# Patient Record
Sex: FEMALE | Race: ASIAN | NOT HISPANIC OR LATINO | Employment: FULL TIME | URBAN - METROPOLITAN AREA
[De-identification: names, ages, dates, MRNs, and addresses within clinical notes are randomized per-mention and may not be internally consistent; named-entity substitution may affect disease eponyms.]

---

## 2019-02-05 ENCOUNTER — OFFICE VISIT (OUTPATIENT)
Dept: GASTROENTEROLOGY | Facility: MEDICAL CENTER | Age: 29
End: 2019-02-05
Payer: COMMERCIAL

## 2019-02-05 ENCOUNTER — LAB (OUTPATIENT)
Dept: LAB | Facility: MEDICAL CENTER | Age: 29
End: 2019-02-05
Attending: INTERNAL MEDICINE
Payer: COMMERCIAL

## 2019-02-05 VITALS
BODY MASS INDEX: 21.6 KG/M2 | TEMPERATURE: 99.1 F | HEART RATE: 64 BPM | WEIGHT: 110 LBS | DIASTOLIC BLOOD PRESSURE: 40 MMHG | SYSTOLIC BLOOD PRESSURE: 84 MMHG | HEIGHT: 60 IN

## 2019-02-05 DIAGNOSIS — K62.5 RECTAL BLEEDING: ICD-10-CM

## 2019-02-05 DIAGNOSIS — R10.13 EPIGASTRIC ABDOMINAL PAIN: ICD-10-CM

## 2019-02-05 DIAGNOSIS — R10.13 EPIGASTRIC ABDOMINAL PAIN: Primary | ICD-10-CM

## 2019-02-05 LAB
CRP SERPL QL: <3 MG/L
ERYTHROCYTE [SEDIMENTATION RATE] IN BLOOD: 8 MM/HOUR (ref 0–20)

## 2019-02-05 PROCEDURE — 36415 COLL VENOUS BLD VENIPUNCTURE: CPT

## 2019-02-05 PROCEDURE — 83516 IMMUNOASSAY NONANTIBODY: CPT

## 2019-02-05 PROCEDURE — 99244 OFF/OP CNSLTJ NEW/EST MOD 40: CPT | Performed by: INTERNAL MEDICINE

## 2019-02-05 PROCEDURE — 86255 FLUORESCENT ANTIBODY SCREEN: CPT

## 2019-02-05 PROCEDURE — 85652 RBC SED RATE AUTOMATED: CPT

## 2019-02-05 PROCEDURE — 86140 C-REACTIVE PROTEIN: CPT

## 2019-02-05 PROCEDURE — 82784 ASSAY IGA/IGD/IGG/IGM EACH: CPT

## 2019-02-05 RX ORDER — PANTOPRAZOLE SODIUM 40 MG/1
40 TABLET, DELAYED RELEASE ORAL DAILY
COMMUNITY

## 2019-02-05 NOTE — LETTER
February 6, 2019     [facility]  [address]    Patient: J Luis Hannon   YOB: 1990   Date of Visit: 2/5/2019       Dear Dr Taurus Machuca: Thank you for referring J Luis Hannon to me for evaluation  Below are my notes for this consultation  If you have questions, please do not hesitate to call me  I look forward to following your patient along with you  Sincerely,    LAURIE Monroe  [facility]  Mobile: [phone]  Available on Skuid  [email or web address]           CC: No Recipients  Tawana Tsang MD  2/6/2019  1:30 PM  Sign at close encounter  [facility] - Outpatient Consultation  J Luis Hannon 34 y o  female MRN: [id number]  Encounter: [id number]      PCP: Henry Roque  Referring: Henry Roque  [address]      ASSESSMENT AND PLAN:      1  Epigastric abdominal pain  Epigastric abdominal pain, associated with bloating, most consistent with dyspepsia versus symptomatic GERD, especially given her improvement, response to PPI therapy this is most consistent with these diagnoses  I would recommend an EGD, to evaluate for erosive GERD, rule out H pylori  R/o celiac with labs as below  - C-reactive protein; Future  - Sedimentation rate, automated; Future  - Celiac Disease Antibody Profile; Future  - Case request operating room: ESOPHAGOGASTRODUODENOSCOPY (EGD); Standing  - Case request operating room: ESOPHAGOGASTRODUODENOSCOPY (EGD)    2  Rectal bleeding  Per history, presentation to PatientCare, hemoglobin has remained stable despite rectal bleeding  Intermittent symptoms suggest anorectal disease  I will obtain a C-reactive protein, sed rate to evaluate, rule out inflammatory bowel disease  If either of these are elevated I would recommend she also undergo colonoscopy    At this time she is hesitant to undergo colonoscopy, and agrees with treatment of localize anorectal disease  I would encourage use of a fiber supplementation with Metamucil, to bulk stools and prevent anorectal irritation   - C-reactive protein; Future  - Sedimentation rate, automated; Future  - Celiac Disease Antibody Profile; Future      ______________________________________________________________________    HPI:      Patient is a 68-year-old female referred for epigastric pain, rectal bleeding  She has no significant past medical history  She reports a longstanding history of abdominal symptoms including postprandial bloating associated with nausea and fatigue  In the past she has been prescribed PPI therapy with good relief of her symptoms, but has not been taking this medication consistently  Two days ago, shortly after dinner time, she complains of an acute onset of epigastric pain and lower pelvic abdominal pain  Symptoms were severe, debilitating in nature, and decreased her ability to sleep at night  She does note associated bloating, burping with the symptoms  She states that she drank bubble tea, a CABG, chicken wings and rice over the weekend  She attempted intake given antacid in marie tea without symptom relief  She then tried her Protonix, 40 mg at home with significant improvement in her symptoms  She was seen at outpatient Patient Care and treated symptomatically  Her symptoms have resolved at time of presentation in my office today  Over the same time  As onset of epigastric pain, she has noted rectal bleeding  First episode occurred once two weeks ago, she was asymptomatic in the interim, and she then had a 2nd episode of rectal bleeding over this past weekend  She describes bright red blood per rectum, that makes the toilet bowl red  She states that her labs, including hemoglobin, were normal at patient care  She has never had an endoscopy or colonoscopy previously    She does admit that her parents, her father who was an internal medicine physician help her out with her symptoms, and a previously encouraged her not to take a PPI medication on a regular basis  REVIEW OF SYSTEMS:    CONSTITUTIONAL: Denies any fever, chills, rigors, and weight loss  HEENT: No earache or tinnitus  Denies hearing loss or visual disturbances  CARDIOVASCULAR: No chest pain or palpitations  RESPIRATORY: Denies any cough, hemoptysis, shortness of breath or dyspnea on exertion  GASTROINTESTINAL: As noted in the History of Present Illness  GENITOURINARY: No problems with urination  Denies any hematuria or dysuria  NEUROLOGIC: No dizziness or vertigo, denies headaches  MUSCULOSKELETAL: Denies any muscle or joint pain  SKIN: Denies skin rashes or itching  ENDOCRINE: Denies excessive thirst  Denies intolerance to heat or cold  PSYCHOSOCIAL: Denies depression or anxiety  Denies any recent memory loss  Historical Information   Past Medical History:   Diagnosis Date    Stomach ulcer      History reviewed  No pertinent surgical history  Social History   History   Alcohol use Not on file     History   Drug use: Unknown     History   Smoking Status    Never Smoker   Smokeless Tobacco    Never Used     Family History   Problem Relation Age of Onset    Colon cancer Paternal Grandmother        Meds/Allergies       Current Outpatient Prescriptions:     pantoprazole (PROTONIX) 40 mg tablet    No Known Allergies        Objective     Blood pressure (!) 84/40, pulse 64, temperature 99 1 °F (37 3 °C), height 5' (1 524 m), weight 49 9 kg (110 lb)  Body mass index is 21 48 kg/m²  PHYSICAL EXAM:      General Appearance:   Alert, cooperative, no distress   HEENT:   Normocephalic, atraumatic, anicteric      Neck:  Supple, symmetrical, trachea midline   Lungs:   Clear to auscultation bilaterally; no rales, rhonchi or wheezing; respirations unlabored    Heart[de-identified]   Regular rate and rhythm; no murmur, rub, or gallop     Abdomen:   Soft, non-tender, non-distended; normal bowel sounds; no masses, no organomegaly    Genitalia:   Deferred    Rectal:   Deferred    Extremities:  No cyanosis, clubbing or edema    Pulses:  2+ and symmetric    Skin:  No jaundice, rashes, or lesions    Lymph nodes:  No palpable cervical lymphadenopathy        Lab Results:       Radiology Results:   No results found

## 2019-02-06 ENCOUNTER — TELEPHONE (OUTPATIENT)
Dept: GASTROENTEROLOGY | Facility: MEDICAL CENTER | Age: 29
End: 2019-02-06

## 2019-02-06 LAB
ENDOMYSIUM IGA SER QL: NEGATIVE
GLIADIN PEPTIDE IGA SER-ACNC: 3 UNITS (ref 0–19)
GLIADIN PEPTIDE IGG SER-ACNC: 2 UNITS (ref 0–19)
IGA SERPL-MCNC: 148 MG/DL (ref 87–352)
TTG IGA SER-ACNC: <2 U/ML (ref 0–3)
TTG IGG SER-ACNC: <2 U/ML (ref 0–5)

## 2019-02-06 NOTE — PATIENT INSTRUCTIONS
The patient is scheduled on 3//7/19 for an egd with Dr Andreea Jimenes at [place] [address]  The Ma went over the prep with the patient  She is aware she will be called the day prior with an arrival time

## 2019-02-06 NOTE — TELEPHONE ENCOUNTER
Called patient, LMOM to call the office    ----- Message from Pearl Duverney, MD sent at 2/6/2019 10:02 AM EST -----  Please call patient with results - inflammatory markers negative

## 2019-02-06 NOTE — PROGRESS NOTES
[facility] - Outpatient Consultation  Sadia Sorensen 34 y o  female MRN: [id number]  Encounter: [id number]      PCP: Casie Hunter  Referring: Casie [place] [address]  [facility], [address] [facility]      ASSESSMENT AND PLAN:      1  Epigastric abdominal pain  Epigastric abdominal pain, associated with bloating, most consistent with dyspepsia versus symptomatic GERD, especially given her improvement, response to PPI therapy this is most consistent with these diagnoses  I would recommend an EGD, to evaluate for erosive GERD, rule out H pylori  R/o celiac with labs as below  I do encourage her to complete a two week course of PPI therapy with protonix 40 mg daily  - C-reactive protein; Future  - Sedimentation rate, automated; Future  - Celiac Disease Antibody Profile; Future  - Case request operating room: ESOPHAGOGASTRODUODENOSCOPY (EGD); Standing  - Case request operating room: ESOPHAGOGASTRODUODENOSCOPY (EGD)    2  Rectal bleeding  [facility] has remained stable despite rectal bleeding  Intermittent symptoms suggest anorectal disease  I will obtain a C-reactive protein, sed rate to evaluate, rule out inflammatory bowel disease  If either of these are elevated I would recommend she also undergo colonoscopy  At this time she is hesitant to undergo colonoscopy, and agrees with treatment of localize anorectal disease  I would encourage use of a fiber supplementation with Metamucil, to bulk stools and prevent anorectal irritation   - C-reactive protein; Future  - Sedimentation rate, automated; Future  - Celiac Disease Antibody Profile; Future      ______________________________________________________________________    HPI:      Patient is a 77-year-old female referred for epigastric pain, rectal bleeding  She has no significant past medical history    She reports a longstanding history of abdominal symptoms including postprandial bloating associated with nausea and fatigue  In the past she has been prescribed PPI therapy with good relief of her symptoms, but has not been taking this medication consistently  Two days ago, shortly after dinner time, she complains of an acute onset of epigastric pain and lower pelvic abdominal pain  Symptoms were severe, debilitating in nature, and decreased her ability to sleep at night  She does note associated bloating, burping with the symptoms  She states that she drank bubble tea, a CABG, chicken wings and rice over the weekend  She attempted intake given antacid in marie tea without symptom relief  She then tried her Protonix, 40 mg at home with significant improvement in her symptoms  She was seen at outpatient Patient Care and treated symptomatically  Her symptoms have resolved at time of presentation in my office today  Over the same time  As onset of epigastric pain, she has noted rectal bleeding  First episode occurred once two weeks ago, she was asymptomatic in the interim, and she then had a 2nd episode of rectal bleeding over this past weekend  She describes bright red blood per rectum, that makes the toilet bowl red  She states that her labs, including hemoglobin, were normal at patient care  She has never had an endoscopy or colonoscopy previously  She does admit that her parents, her father who was an internal medicine physician help her out with her symptoms, and a previously encouraged her not to take a PPI medication on a regular basis  REVIEW OF SYSTEMS:    CONSTITUTIONAL: Denies any fever, chills, rigors, and weight loss  HEENT: No earache or tinnitus  Denies hearing loss or visual disturbances  CARDIOVASCULAR: No chest pain or palpitations  RESPIRATORY: Denies any cough, hemoptysis, shortness of breath or dyspnea on exertion  GASTROINTESTINAL: As noted in the History of Present Illness  GENITOURINARY: No problems with urination   Denies any hematuria or dysuria  NEUROLOGIC: No dizziness or vertigo, denies headaches  MUSCULOSKELETAL: Denies any muscle or joint pain  SKIN: Denies skin rashes or itching  ENDOCRINE: Denies excessive thirst  Denies intolerance to heat or cold  PSYCHOSOCIAL: Denies depression or anxiety  Denies any recent memory loss  Historical Information   Past Medical History:   Diagnosis Date    Stomach ulcer      History reviewed  No pertinent surgical history  Social History   History   Alcohol use Not on file     History   Drug use: Unknown     History   Smoking Status    Never Smoker   Smokeless Tobacco    Never Used     Family History   Problem Relation Age of Onset    Colon cancer Paternal Grandmother        Meds/Allergies       Current Outpatient Prescriptions:     pantoprazole (PROTONIX) 40 mg tablet    No Known Allergies        Objective     Blood pressure (!) 84/40, pulse 64, temperature 99 1 °F (37 3 °C), height 5' (1 524 m), weight 49 9 kg (110 lb)  Body mass index is 21 48 kg/m²  PHYSICAL EXAM:      General Appearance:   Alert, cooperative, no distress   HEENT:   Normocephalic, atraumatic, anicteric      Neck:  Supple, symmetrical, trachea midline   Lungs:   Clear to auscultation bilaterally; no rales, rhonchi or wheezing; respirations unlabored    Heart[de-identified]   Regular rate and rhythm; no murmur, rub, or gallop  Abdomen:   Soft, non-tender, non-distended; normal bowel sounds; no masses, no organomegaly    Genitalia:   Deferred    Rectal:   Deferred    Extremities:  No cyanosis, clubbing or edema    Pulses:  2+ and symmetric    Skin:  No jaundice, rashes, or lesions    Lymph nodes:  No palpable cervical lymphadenopathy        Lab Results:       Radiology Results:   No results found

## 2019-02-12 ENCOUNTER — TELEPHONE (OUTPATIENT)
Dept: GASTROENTEROLOGY | Facility: MEDICAL CENTER | Age: 29
End: 2019-02-12

## 2019-02-12 ENCOUNTER — TELEPHONE (OUTPATIENT)
Dept: GASTROENTEROLOGY | Facility: CLINIC | Age: 29
End: 2019-02-12

## 2019-02-12 DIAGNOSIS — K62.5 RECTAL BLEEDING: Primary | ICD-10-CM

## 2019-02-12 NOTE — TELEPHONE ENCOUNTER
----- Message from Tawana Tsang MD sent at 2/8/2019  2:54 PM EST -----  Please call patient with results - celiac labs were negative

## 2019-02-12 NOTE — TELEPHONE ENCOUNTER
antoni pt    Pt called in requesting a call back from dr Regla Bronson in regards to her upcoming egd/care   [phone]

## 2019-02-12 NOTE — TELEPHONE ENCOUNTER
Added colon to egd with dr Nilda Pat on 3/7/19 ok per darci at [facility], I sent suprep to pt pharmacy and mailed out instructions to pt   I left pt a detailed message with all the above information and my direct line to call back if need be

## 2019-02-12 NOTE — TELEPHONE ENCOUNTER
Can you please call her to add a colonoscopy to her EGD? She continues to have rectal bleeding and would like to schedule the colonoscopy  Discussed with Dr Katelin Drew as well

## 2019-02-13 ENCOUNTER — TELEPHONE (OUTPATIENT)
Dept: GASTROENTEROLOGY | Facility: HOSPITAL | Age: 29
End: 2019-02-13

## 2019-03-06 ENCOUNTER — ANESTHESIA EVENT (OUTPATIENT)
Dept: GASTROENTEROLOGY | Facility: MEDICAL CENTER | Age: 29
End: 2019-03-06
Payer: COMMERCIAL

## 2019-03-07 ENCOUNTER — HOSPITAL ENCOUNTER (OUTPATIENT)
Facility: MEDICAL CENTER | Age: 29
Setting detail: OUTPATIENT SURGERY
Discharge: HOME | End: 2019-03-07
Attending: INTERNAL MEDICINE | Admitting: INTERNAL MEDICINE
Payer: COMMERCIAL

## 2019-03-07 ENCOUNTER — ANESTHESIA (OUTPATIENT)
Dept: GASTROENTEROLOGY | Facility: MEDICAL CENTER | Age: 29
End: 2019-03-07
Payer: COMMERCIAL

## 2019-03-07 VITALS
WEIGHT: 108 LBS | HEIGHT: 60 IN | OXYGEN SATURATION: 100 % | SYSTOLIC BLOOD PRESSURE: 99 MMHG | BODY MASS INDEX: 21.2 KG/M2 | DIASTOLIC BLOOD PRESSURE: 57 MMHG | TEMPERATURE: 99.3 F | HEART RATE: 58 BPM | RESPIRATION RATE: 16 BRPM

## 2019-03-07 DIAGNOSIS — R10.13 EPIGASTRIC ABDOMINAL PAIN: ICD-10-CM

## 2019-03-07 DIAGNOSIS — K64.8 HEMORRHOIDS, INTERNAL, WITH BLEEDING: Primary | ICD-10-CM

## 2019-03-07 LAB — EXT PREGNANCY TEST URINE: NEGATIVE

## 2019-03-07 PROCEDURE — 43239 EGD BIOPSY SINGLE/MULTIPLE: CPT | Performed by: INTERNAL MEDICINE

## 2019-03-07 PROCEDURE — 45380 COLONOSCOPY AND BIOPSY: CPT | Performed by: INTERNAL MEDICINE

## 2019-03-07 PROCEDURE — 88305 TISSUE EXAM BY PATHOLOGIST: CPT | Performed by: PATHOLOGY

## 2019-03-07 PROCEDURE — 81025 URINE PREGNANCY TEST: CPT | Performed by: ANESTHESIOLOGY

## 2019-03-07 RX ORDER — PROPOFOL 10 MG/ML
INJECTION, EMULSION INTRAVENOUS AS NEEDED
Status: DISCONTINUED | OUTPATIENT
Start: 2019-03-07 | End: 2019-03-07 | Stop reason: SURG

## 2019-03-07 RX ORDER — CETIRIZINE HYDROCHLORIDE 10 MG/1
10 TABLET ORAL DAILY
COMMUNITY

## 2019-03-07 RX ORDER — CHOLECALCIFEROL (VITAMIN D3) 125 MCG
5 CAPSULE ORAL DAILY
COMMUNITY

## 2019-03-07 RX ORDER — SODIUM CHLORIDE 9 MG/ML
125 INJECTION, SOLUTION INTRAVENOUS CONTINUOUS
Status: DISCONTINUED | OUTPATIENT
Start: 2019-03-07 | End: 2019-03-07 | Stop reason: HOSPADM

## 2019-03-07 RX ADMIN — PROPOFOL 50 MG: 10 INJECTION, EMULSION INTRAVENOUS at 11:21

## 2019-03-07 RX ADMIN — PROPOFOL 100 MG: 10 INJECTION, EMULSION INTRAVENOUS at 11:19

## 2019-03-07 RX ADMIN — SODIUM CHLORIDE: 0.9 INJECTION, SOLUTION INTRAVENOUS at 11:08

## 2019-03-07 RX ADMIN — PROPOFOL 50 MG: 10 INJECTION, EMULSION INTRAVENOUS at 11:39

## 2019-03-07 RX ADMIN — PROPOFOL 50 MG: 10 INJECTION, EMULSION INTRAVENOUS at 11:28

## 2019-03-07 RX ADMIN — PROPOFOL 50 MG: 10 INJECTION, EMULSION INTRAVENOUS at 11:33

## 2019-03-07 RX ADMIN — PROPOFOL 50 MG: 10 INJECTION, EMULSION INTRAVENOUS at 11:31

## 2019-03-07 RX ADMIN — PROPOFOL 50 MG: 10 INJECTION, EMULSION INTRAVENOUS at 11:25

## 2019-03-07 NOTE — DISCHARGE INSTR - AVS FIRST PAGE
OPERATIVE REPORT  PATIENT NAME: Saintclair Deter    :  1990  MRN: [id number]  Pt Location: [facility] GI ROOM 01    SURGERY DATE: 3/7/2019    Surgeon(s) and Role:     * Lulú Crowder MD - Primary    Preop Diagnosis:  Epigastric abdominal pain [R10 13]    Post-Op Diagnosis Codes:     * Epigastric abdominal pain [R10 13]    Procedure(s) (LRB):  ESOPHAGOGASTRODUODENOSCOPY (EGD) (N/A)  COLONOSCOPY (N/A)    ESOPHAGOGASTRODUODENOSCOPY    PROCEDURE: EGD    SEDATION: Monitored anesthesia care, check anesthesia records    ASA Class: 2    INDICATIONS: abdominal pain, bloating    CONSENT:  Informed consent was obtained for the procedure, including sedation after explaining the risks and benefits of the procedure  Risks including but not limited to bleeding, perforation, infection, and missed lesion  PREPARATION:   Telemetry, pulse oximetry, blood pressure were monitored throughout the procedure  Patient was identified by myself both verbally and by visual inspection of ID band  DESCRIPTION:   Patient was placed in the left lateral decubitus position and was sedated with the above medication  The gastroscope was introduced in to the oropharynx and the esophagus was intubated under direct visualization  Scope was passed down the esophagus up to 2nd part of the duodenum  A careful inspection was made as the gastroscope was withdrawn, including a retroflexed view of the stomach; findings and interventions are described below  FINDINGS:    #1  Esophagus- normal esophagus  #2  Stomach- normal stomach, biopsies of the antrum and gastric body taken to rule out H pylori  Normal GE junction retroflexed views  #3  Duodenum- normal bulb and postbulbar duodenum, cold forcep biopsies taken to rule out celiac disease           IMPRESSIONS:      Normal endoscopy    RECOMMENDATIONS:     Follow-up pathology  Proceed to colonoscopy as previously scheduled  Continue protonix for symptoms    COMPLICATIONS:  None; patient tolerated the procedure well  SPECIMENS:    ID Type Source Tests Collected by Time Destination   1 : Duodenum biopsy r/o celiac Tissue Duodenum TISSUE EXAM Lu Ferro MD 3/7/2019 1119    2 : Stomach biopsy r/o h pylori  Tissue Stomach TISSUE EXAM Lu Ferro MD 3/7/2019 1122        ESTIMATED BLOOD LOSS:  Minimal      SIGNATURE: Lu Ferro MD  DATE: 2019  TIME: 11:23 AM      OPERATIVE REPORT  PATIENT NAME: Ayush Silva    :  1990  MRN: [id number]  Pt Location: [facility] GI ROOM 01    SURGERY DATE: 3/7/2019    Surgeon(s) and Role:     Alberto Avila MD - Primary    Preop Diagnosis:  Epigastric abdominal pain [R10 13]    Post-Op Diagnosis Codes:     * Epigastric abdominal pain [R10 13]    Procedure(s) (LRB):  ESOPHAGOGASTRODUODENOSCOPY (EGD) (N/A)  COLONOSCOPY (N/A)    Colonoscopy Procedure Note    Procedure: Colonoscopy    Sedation: Monitored anesthesia care, check anesthesia records      ASA Class: 2    INDICATIONS: rectal bleeding, abdominal pain    POST-OP DIAGNOSIS: See the impression below    Procedure Details     Prior colonoscopy: No prior colonoscopy  Informed consent was obtained for the procedure, including sedation  Risks of perforation, hemorrhage, adverse drug reaction and aspiration were discussed  The patient was placed in the left lateral decubitus position  Based on the pre-procedure assessment, including review of the patient's medical history, medications, allergies, and review of systems, she had been deemed to be an appropriate candidate for conscious sedation; she was therefore sedated with the medications listed below  The patient was monitored continuously with telemetry, pulse oximetry, blood pressure monitoring, and direct observations  A rectal examination was performed  The pediatric colonoscope was inserted into the rectum and advanced under direct vision to the terminal ileum  The quality of the colonic preparation was good    A careful inspection was made as the colonoscope was withdrawn, including a retroflexed view of the rectum; findings and interventions are described below  Findings:    Normal terminal ileum, biopsies taken  Normal colonic mucosa noted throughout the entire colon, random cold forcep biopsies taken to rule out microscopic colitis  Internal hemorrhoids noted on rectal retroflexed views  Complications: None; patient tolerated the procedure well  Impression:      Normal terminal ileum  Normal colonoscopy  Internal hemorrhoids  Recommendations:   Repeat colonoscopy is recommended for age-appropriate screening a 48years old unless otherwise clinically indicated  Follow up biopsies  Rectal topical prescribed for rectal bleeding related to hemorrhoidal disease  Encourage fiber intake  Resume regular diet  Discharge home  COMPLICATIONS:  None; patient tolerated the procedure well      SPECIMENS:    ID Type Source Tests Collected by Time Destination   3 : Terminal Illeum biopsy  Tissue Terminal Ileum TISSUE EXAM Armen Faye MD 3/7/2019 1136    4 : Random colon pbiopsy r/o microscopic colitis  Tissue Colon TISSUE EXAM Armen Faye MD 3/7/2019 1137        ESTIMATED BLOOD LOSS:  Minimal      SIGNATURE: Armen Faye MD  DATE: March 7, 2019  TIME: 11:47 AM

## 2019-03-07 NOTE — ANESTHESIA PREPROCEDURE EVALUATION
Review of Systems/Medical History          Cardiovascular  Negative cardio ROS    Pulmonary  Negative pulmonary ROS        GI/Hepatic    PUD,             Endo/Other  Negative endo/other ROS      GYN       Hematology  Negative hematology ROS      Musculoskeletal       Neurology  Negative neurology ROS      Psychology   Negative psychology ROS              Physical Exam    Airway    Mallampati score: I  TM Distance: >3 FB  Neck ROM: full     Dental   No notable dental hx     Cardiovascular  Comment: Negative ROS, Rhythm: regular, Rate: normal, Cardiovascular exam normal    Pulmonary  Pulmonary exam normal     Other Findings        Anesthesia Plan  ASA Score- 2     Anesthesia Type- IV sedation with anesthesia with ASA Monitors  Additional Monitors:   Airway Plan:         Plan Factors-    Induction- intravenous  Postoperative Plan-     Informed Consent- Anesthetic plan and risks discussed with patient

## 2019-03-07 NOTE — OP NOTE
OPERATIVE REPORT  PATIENT NAME: Ambrose Arroyo    :  1990  MRN: [id number]  Pt Location: [facility] GI ROOM 01    SURGERY DATE: 3/7/2019    Surgeon(s) and Role:     * Grace Marcial MD - Primary    Preop Diagnosis:  Epigastric abdominal pain [R10 13]    Post-Op Diagnosis Codes:     * Epigastric abdominal pain [R10 13]    Procedure(s) (LRB):  ESOPHAGOGASTRODUODENOSCOPY (EGD) (N/A)  COLONOSCOPY (N/A)    ESOPHAGOGASTRODUODENOSCOPY    PROCEDURE: EGD    SEDATION: Monitored anesthesia care, check anesthesia records    ASA Class: 2    INDICATIONS: abdominal pain, bloating    CONSENT:  Informed consent was obtained for the procedure, including sedation after explaining the risks and benefits of the procedure  Risks including but not limited to bleeding, perforation, infection, and missed lesion  PREPARATION:   Telemetry, pulse oximetry, blood pressure were monitored throughout the procedure  Patient was identified by myself both verbally and by visual inspection of ID band  DESCRIPTION:   Patient was placed in the left lateral decubitus position and was sedated with the above medication  The gastroscope was introduced in to the oropharynx and the esophagus was intubated under direct visualization  Scope was passed down the esophagus up to 2nd part of the duodenum  A careful inspection was made as the gastroscope was withdrawn, including a retroflexed view of the stomach; findings and interventions are described below  FINDINGS:    #1  Esophagus- normal esophagus  #2  Stomach- normal stomach, biopsies of the antrum and gastric body taken to rule out H pylori  Normal GE junction retroflexed views  #3  Duodenum- normal bulb and postbulbar duodenum, cold forcep biopsies taken to rule out celiac disease           IMPRESSIONS:      Normal endoscopy    RECOMMENDATIONS:     Follow-up pathology  Proceed to colonoscopy as previously scheduled  Continue protonix for symptoms    COMPLICATIONS:  None; patient tolerated the procedure well      SPECIMENS:    ID Type Source Tests Collected by Time Destination   1 : Duodenum biopsy r/o celiac Tissue Duodenum TISSUE EXAM Dyan Gilliam MD 3/7/2019 1119    2 : Stomach biopsy r/o h pylori  Tissue Stomach TISSUE EXAM Dyan Gilliam MD 3/7/2019 1122        ESTIMATED BLOOD LOSS:  Minimal      SIGNATURE: Dyan Gilliam MD  DATE: March 7, 2019  TIME: 11:23 AM

## 2019-03-07 NOTE — H&P
History and Physical -  [facility]  Lola Argueta 34 y o  female MRN: [id number]                  HPI: Lola Argueta is a 34y o  year old female who presents for bloating, abdominal pain, rectal bleeding  REVIEW OF SYSTEMS: Per the HPI, and otherwise unremarkable  Historical Information   Past Medical History:   Diagnosis Date    Stomach ulcer      Past Surgical History:   Procedure Laterality Date    BREAST SURGERY      WISDOM TOOTH EXTRACTION       Social History   Social History     Substance and Sexual Activity   Alcohol Use Yes    Frequency: 2-4 times a month     Social History     Substance and Sexual Activity   Drug Use Not on file     Social History     Tobacco Use   Smoking Status Never Smoker   Smokeless Tobacco Never Used     Family History   Problem Relation Age of Onset    Colon cancer Paternal Grandmother        Meds/Allergies     Medications Prior to Admission   Medication    Melatonin 5 MG TABS    cetirizine (ZyrTEC) 10 mg tablet    pantoprazole (PROTONIX) 40 mg tablet       No Known Allergies    Objective     Blood pressure 105/57, pulse 71, temperature 100 1 °F (37 8 °C), temperature source Temporal, resp  rate 16, height 5' (1 524 m), weight 49 kg (108 lb), last menstrual period 02/28/2019, SpO2 97 %  PHYSICAL EXAM    Gen: NAD  CV: RRR  CHEST: Clear  ABD: soft, NT/ND  EXT: no edema      ASSESSMENT/PLAN:  This is a 34y o  year old female here for EGD and colonoscopy, and she is stable and optimized for her procedure

## 2019-03-07 NOTE — OP NOTE
OPERATIVE REPORT  PATIENT NAME: Pat Boone    :  1990  MRN: [id number]  Pt Location: [facility] GI ROOM 01    SURGERY DATE: 3/7/2019    Surgeon(s) and Role:     Arturo Johns MD - Primary    Preop Diagnosis:  Epigastric abdominal pain [R10 13]    Post-Op Diagnosis Codes:     * Epigastric abdominal pain [R10 13]    Procedure(s) (LRB):  ESOPHAGOGASTRODUODENOSCOPY (EGD) (N/A)  COLONOSCOPY (N/A)    Colonoscopy Procedure Note    Procedure: Colonoscopy    Sedation: Monitored anesthesia care, check anesthesia records      ASA Class: 2    INDICATIONS: rectal bleeding, abdominal pain    POST-OP DIAGNOSIS: See the impression below    Procedure Details     Prior colonoscopy: No prior colonoscopy  Informed consent was obtained for the procedure, including sedation  Risks of perforation, hemorrhage, adverse drug reaction and aspiration were discussed  The patient was placed in the left lateral decubitus position  Based on the pre-procedure assessment, including review of the patient's medical history, medications, allergies, and review of systems, she had been deemed to be an appropriate candidate for conscious sedation; she was therefore sedated with the medications listed below  The patient was monitored continuously with telemetry, pulse oximetry, blood pressure monitoring, and direct observations  A rectal examination was performed  The pediatric colonoscope was inserted into the rectum and advanced under direct vision to the terminal ileum  The quality of the colonic preparation was good  A careful inspection was made as the colonoscope was withdrawn, including a retroflexed view of the rectum; findings and interventions are described below  Findings:    Normal terminal ileum, biopsies taken  Normal colonic mucosa noted throughout the entire colon, random cold forcep biopsies taken to rule out microscopic colitis  Internal hemorrhoids noted on rectal retroflexed views             Complications: None; patient tolerated the procedure well  Impression:      Normal terminal ileum  Normal colonoscopy  Internal hemorrhoids  Recommendations:   Repeat colonoscopy is recommended for age-appropriate screening a 48years old unless otherwise clinically indicated  Follow up biopsies  Rectal topical prescribed for rectal bleeding related to hemorrhoidal disease  Encourage fiber intake  Resume regular diet  Discharge home  COMPLICATIONS:  None; patient tolerated the procedure well      SPECIMENS:    ID Type Source Tests Collected by Time Destination   3 : Terminal Illeum biopsy  Tissue Terminal Ileum TISSUE EXAM Atif Justice MD 3/7/2019 1136    4 : Random colon pbiopsy r/o microscopic colitis  Tissue Colon TISSUE EXAM Atif Justice MD 3/7/2019 1137        ESTIMATED BLOOD LOSS:  Minimal      SIGNATURE: Atif Justice MD  DATE: March 7, 2019  TIME: 11:47 AM

## 2019-03-07 NOTE — DISCHARGE INSTRUCTIONS
Upper Endoscopy   WHAT YOU NEED TO KNOW:   An upper endoscopy is also called an upper gastrointestinal (GI) endoscopy, or an esophagogastroduodenoscopy (EGD)  You may feel bloated, gassy, or have some abdominal discomfort after your procedure  Your throat may be sore for 24 to 36 hours  You may burp or pass gas from air that is still inside your body  DISCHARGE INSTRUCTIONS:   Call 911 for any of the following:   · You have sudden chest pain or trouble breathing  Seek care immediately if:   · You feel dizzy or faint  · You have trouble swallowing  · Your bowel movements are very dark or black  · Your abdomen is hard and firm and you have severe pain  · You vomit blood  Contact your healthcare provider if:   · You feel full or bloated and cannot burp or pass gas  · You have not had a bowel movement for 3 days after your procedure  · You have neck pain  · You have a fever or chills  · You have nausea or are vomiting  · You have a rash or hives  · You have questions or concerns about your endoscopy  Relieve a sore throat:  Suck on throat lozenges or crushed ice  Gargle with a small amount of warm salt water  Mix 1 teaspoon of salt and 1 cup of warm water to make salt water  Relieve gas and discomfort from bloating:  Lie on your right side with a heating pad on your abdomen  Take short walks to help pass gas  Eat small meals until bloating is relieved  Rest after your procedure: You have been given medicine to relax you  Do not  drive or make important decisions until the day after your procedure  Return to your normal activity as directed  You can usually return to work the day after your procedure  Follow up with your healthcare provider as directed:  Write down your questions so you remember to ask them during your visits     © 2017 [address] is for End User's use only and may not be sold, redistributed or otherwise used for commercial purposes  All illustrations and images included in CareNotes® are the copyrighted property of [employer]  or [employer]  The above information is an  only  It is not intended as medical advice for individual conditions or treatments  Talk to your doctor, nurse or pharmacist before following any medical regimen to see if it is safe and effective for you  Colonoscopy   WHAT YOU NEED TO KNOW:   A colonoscopy is a procedure to examine the inside of your colon (intestine) with a scope  Polyps or tissue growths may have been removed during your colonoscopy  It is normal to feel bloated and to have some abdominal discomfort  You should be passing gas  If you have hemorrhoids or you had polyps removed, you may have a small amount of bleeding  DISCHARGE INSTRUCTIONS:   Seek care immediately if:   · You have a large amount of bright red blood in your bowel movements  · Your abdomen is hard and firm and you have severe pain  · You have sudden trouble breathing  Contact your healthcare provider if:   · You develop a rash or hives  · You have a fever within 24 hours of your procedure       · You have not had a bowel movement for 3 days after your procedure  · You have questions or concerns about your condition or care  Activity:   · Do not lift, strain, or run  for 3 days after your procedure  · Rest after your procedure  You have been given medicine to relax you  Do not  drive or make important decisions until the day after your procedure  Return to your normal activity as directed  · Relieve gas and discomfort from bloating  by lying on your right side with a heating pad on your abdomen  You may need to take short walks to help the gas move out  Eat small meals until bloating is relieved  If you had polyps removed: For 7 days after your procedure:  · Do not  take aspirin  · Do not  go on long car rides    Follow up with your healthcare provider as directed: Write down your questions so you remember to ask them during your visits  © 2017 [address] is for End User's use only and may not be sold, redistributed or otherwise used for commercial purposes  All illustrations and images included in CareNotes® are the copyrighted property of [employer]  or [employer]  The above information is an  only  It is not intended as medical advice for individual conditions or treatments  Talk to your doctor, nurse or pharmacist before following any medical regimen to see if it is safe and effective for you

## 2019-03-19 ENCOUNTER — TELEPHONE (OUTPATIENT)
Dept: GASTROENTEROLOGY | Facility: CLINIC | Age: 29
End: 2019-03-19

## 2019-03-19 NOTE — TELEPHONE ENCOUNTER
Dr Carlos Alberto Galvan pt called looking for her results of EGD from 03/07/19   Pt ask if she is not able to answer please leave a detailed message as she is at work

## 2019-05-20 ENCOUNTER — TELEPHONE (OUTPATIENT)
Dept: GASTROENTEROLOGY | Facility: AMBULARY SURGERY CENTER | Age: 29
End: 2019-05-20

## 2019-06-25 ENCOUNTER — TELEPHONE (OUTPATIENT)
Dept: GASTROENTEROLOGY | Facility: MEDICAL CENTER | Age: 29
End: 2019-06-25